# Patient Record
Sex: MALE | Race: WHITE | Employment: OTHER | URBAN - METROPOLITAN AREA
[De-identification: names, ages, dates, MRNs, and addresses within clinical notes are randomized per-mention and may not be internally consistent; named-entity substitution may affect disease eponyms.]

---

## 2020-05-10 ENCOUNTER — APPOINTMENT (OUTPATIENT)
Dept: ULTRASOUND IMAGING | Age: 65
End: 2020-05-10
Attending: EMERGENCY MEDICINE
Payer: MEDICARE

## 2020-05-10 ENCOUNTER — HOSPITAL ENCOUNTER (EMERGENCY)
Age: 65
Discharge: HOME | End: 2020-05-10
Attending: EMERGENCY MEDICINE
Payer: MEDICARE

## 2020-05-10 VITALS
HEIGHT: 70 IN | SYSTOLIC BLOOD PRESSURE: 134 MMHG | HEART RATE: 74 BPM | BODY MASS INDEX: 31.88 KG/M2 | OXYGEN SATURATION: 97 % | WEIGHT: 222.66 LBS | TEMPERATURE: 98.2 F | RESPIRATION RATE: 16 BRPM | DIASTOLIC BLOOD PRESSURE: 89 MMHG

## 2020-05-10 DIAGNOSIS — L03.115 CELLULITIS OF RIGHT LOWER LEG: Primary | ICD-10-CM

## 2020-05-10 PROCEDURE — 99282 EMERGENCY DEPT VISIT SF MDM: CPT

## 2020-05-10 PROCEDURE — 93971 EXTREMITY STUDY: CPT

## 2020-05-10 RX ORDER — CEPHALEXIN 500 MG/1
500 CAPSULE ORAL 3 TIMES DAILY
Qty: 21 CAP | Refills: 0 | Status: SHIPPED | OUTPATIENT
Start: 2020-05-10 | End: 2020-05-17

## 2020-05-10 RX ORDER — AMLODIPINE BESYLATE 10 MG/1
TABLET ORAL DAILY
COMMUNITY
End: 2020-10-06

## 2020-05-10 RX ORDER — DUTASTERIDE AND TAMSULOSIN HYDROCHLORIDE CAPSULES .5; .4 MG/1; MG/1
0.4 CAPSULE ORAL
COMMUNITY
End: 2020-10-06

## 2020-05-10 RX ORDER — METFORMIN HYDROCHLORIDE 1000 MG/1
TABLET, FILM COATED, EXTENDED RELEASE ORAL DAILY
COMMUNITY

## 2020-05-10 NOTE — ED NOTES
Gabby Stokes MD reviewed discharge instructions with patient. Opportunity for questions provided, patient verbalized understanding. Ambulatory with steady gait out of department.

## 2020-05-10 NOTE — ED PROVIDER NOTES
70-year-old male presents from home via private vehicle with a chief complaint of leg swelling. Patient noted symptoms starting about 3 to 4 days ago. Initially had a fever for which she sought coronavirus testing which came back negative. He only had a fever on 1 day and no subsequent fevers. He denies any cough or shortness of breath. He subsequently developed swelling in the right lower calf region along with some tenderness. Who presents with concern for DVT. He reports he had a DVT in the past in 1979 he thinks it may be in the same leg. No exacerbating or alleviating factors. No pain medications taken at home. He has had no further fevers or other symptoms. Denies any chest pain or shortness of breath. Patient adds that he had a recent car trip from [place] that was about 5 or 6 hours in duration. He currently takes no blood thinning medications. Past medical history is significant for back pain, chest pain, diverticulitis, cholecystectomy, DVT.            Past Medical History:   Diagnosis Date    Arthritis     Back pain     Chest pain 5/1/2012    Cholelithiasis 11/12/2010    Chronic pain     Diverticulitis     Endocrine disease     hypothyroidism    Foot drop, left     Headache     Joint pain     Other ill-defined conditions diverticulitis    S/P laparoscopic cholecystectomy 11/29/2010    Thromboembolus ([address] 1979    after dislocated hip and bed rest x 3 weeks    Thyroid disease     hypo    Wrist fracture        Past Surgical History:   Procedure Laterality Date    ENDOSCOPY, COLON, DIAGNOSTIC  2005;2010    HX APPENDECTOMY      HX CHOLECYSTECTOMY  11/13/2010    Dr Beatty Manual    HX HEENT      lasik bilateral    HX HERNIA REPAIR      x 2    HX HIP REPLACEMENT      HX ORTHOPAEDIC  5544,8922    arthroscopy right knee x 2    HX ORTHOPAEDIC  2006    left hip replacement    HX ORTHOPAEDIC      right wrist surgery with pins    HX ORTHOPAEDIC      left foot tendon transfer    HX ORTHOPAEDIC  12     RIGHT TOTAL KNEE ARTHROPLASTY    HX TONSILLECTOMY      HX UROLOGICAL      vasectomy    [place] FOREARM/WRIST SURGERY UNLISTED           Family History:   Problem Relation Age of Onset    Arthritis-rheumatoid Father     Heart Disease Maternal Grandmother     Hypertension Maternal Grandmother     Cancer Maternal Grandfather     Cancer Paternal Grandmother     Heart Disease Paternal Grandmother     Hypertension Paternal Grandmother     Diabetes Mother     Hypertension Mother     Cancer Maternal Grandmother         BCA       Social History     Socioeconomic History    Marital status:      Spouse name: Not on file    Number of children: Not on file    Years of education: Not on file    Highest education level: Not on file   Occupational History    Not on file   Social Needs    Financial resource strain: Not on file    Food insecurity     Worry: Not on file     Inability: Not on file    Transportation needs     Medical: Not on file     Non-medical: Not on file   Tobacco Use    Smoking status: Former Smoker     Packs/day: 0.50     Years: 5.00     Pack years: 2.50     Last attempt to quit: 1982     Years since quittin.8    Smokeless tobacco: Former User   Substance and Sexual Activity    Alcohol use:  Yes     Alcohol/week: 3.3 standard drinks     Comment: Social    Drug use: No     Types: OTC    Sexual activity: Yes     Partners: Female     Birth control/protection: Surgical   Lifestyle    Physical activity     Days per week: Not on file     Minutes per session: Not on file    Stress: Not on file   Relationships    Social connections     Talks on phone: Not on file     Gets together: Not on file     Attends Yarsani service: Not on file     Active member of club or organization: Not on file     Attends meetings of clubs or organizations: Not on file     Relationship status: Not on file    Intimate partner violence     Fear of current or ex partner: Not on file Emotionally abused: Not on file     Physically abused: Not on file     Forced sexual activity: Not on file   Other Topics Concern    Not on file   Social History Narrative    ** Merged History Encounter **              ALLERGIES: Erythromycin; Erythromycin; Pcn [penicillins]; and Pcn [penicillins]    Review of Systems   Constitutional: Negative for diaphoresis and fever. HENT: Negative for facial swelling. Eyes: Negative for visual disturbance. Respiratory: Negative for cough. Cardiovascular: Negative for chest pain. Gastrointestinal: Negative for abdominal pain. Genitourinary: Negative for dysuria. Musculoskeletal: Negative for joint swelling. Skin: Negative for rash. Neurological: Negative for headaches. Hematological: Negative for adenopathy. Psychiatric/Behavioral: Negative for suicidal ideas. Vitals:    05/10/20 1716   BP: 134/89   Pulse: 74   Resp: 16   Temp: 98.2 °F (36.8 °C)   SpO2: 97%   Weight: 101 kg (222 lb 10.6 oz)   Height: 5' 10\" (1.778 m)            Physical Exam  Vitals signs and nursing note reviewed. Constitutional:       General: He is not in acute distress. Appearance: He is well-developed. HENT:      Head: Normocephalic and atraumatic. Eyes:      Pupils: Pupils are equal, round, and reactive to light. Neck:      Musculoskeletal: Normal range of motion and neck supple. Cardiovascular:      Rate and Rhythm: Normal rate. Pulmonary:      Effort: Pulmonary effort is normal. No respiratory distress. Abdominal:      General: There is no distension. Musculoskeletal: Normal range of motion. Right lower leg: Edema present. Skin:     General: Skin is warm and dry. Neurological:      Mental Status: He is alert and oriented to person, place, and time. MDM  Number of Diagnoses or Management Options  Diagnosis management comments: Assessment: Swelling and pain of the right lower calf region. Minimal to the skin.   No obvious fluid collections or breaks in the skin. Symptoms could be the result of a DVT especially considering the patient's recent travel history. Given his fever 4 days ago cannot exclude a cellulitis although I think this is less likely. Will obtain a lower extremity DVT study. If positive he can be discharged home on oral anticoagulants. If negative will consider a course of antibiotics to treat for cellulitis.          Procedures

## 2020-05-10 NOTE — DISCHARGE INSTRUCTIONS

## 2020-08-20 ENCOUNTER — HOSPITAL ENCOUNTER (OUTPATIENT)
Dept: PREADMISSION TESTING | Age: 65
Discharge: HOME | End: 2020-08-20
Payer: MEDICARE

## 2020-08-20 DIAGNOSIS — Z01.812 PRE-PROCEDURE LAB EXAM: ICD-10-CM

## 2020-08-20 PROCEDURE — 87635 SARS-COV-2 COVID-19 AMP PRB: CPT

## 2020-08-21 LAB — SARS-COV-2, COV2NT: NOT DETECTED

## 2020-08-24 ENCOUNTER — HOSPITAL ENCOUNTER (OUTPATIENT)
Age: 65
Setting detail: OUTPATIENT SURGERY
Discharge: HOME | End: 2020-08-24
Attending: INTERNAL MEDICINE | Admitting: INTERNAL MEDICINE
Payer: MEDICARE

## 2020-08-24 ENCOUNTER — ANESTHESIA (OUTPATIENT)
Dept: ENDOSCOPY | Age: 65
End: 2020-08-24
Payer: MEDICARE

## 2020-08-24 ENCOUNTER — ANESTHESIA EVENT (OUTPATIENT)
Dept: ENDOSCOPY | Age: 65
End: 2020-08-24
Payer: MEDICARE

## 2020-08-24 VITALS
SYSTOLIC BLOOD PRESSURE: 119 MMHG | HEIGHT: 70 IN | RESPIRATION RATE: 12 BRPM | TEMPERATURE: 97.6 F | WEIGHT: 224 LBS | OXYGEN SATURATION: 96 % | DIASTOLIC BLOOD PRESSURE: 75 MMHG | BODY MASS INDEX: 32.07 KG/M2 | HEART RATE: 55 BPM

## 2020-08-24 PROCEDURE — 76060000031 HC ANESTHESIA FIRST 0.5 HR: Performed by: INTERNAL MEDICINE

## 2020-08-24 PROCEDURE — 77030009426 HC FCPS BIOP ENDOSC BSC -B: Performed by: INTERNAL MEDICINE

## 2020-08-24 PROCEDURE — 74011250637 HC RX REV CODE- 250/637: Performed by: INTERNAL MEDICINE

## 2020-08-24 PROCEDURE — 76040000019: Performed by: INTERNAL MEDICINE

## 2020-08-24 PROCEDURE — 88305 TISSUE EXAM BY PATHOLOGIST: CPT

## 2020-08-24 PROCEDURE — 74011250636 HC RX REV CODE- 250/636: Performed by: NURSE ANESTHETIST, CERTIFIED REGISTERED

## 2020-08-24 RX ORDER — SODIUM CHLORIDE 0.9 % (FLUSH) 0.9 %
5-40 SYRINGE (ML) INJECTION AS NEEDED
Status: DISCONTINUED | OUTPATIENT
Start: 2020-08-24 | End: 2020-08-24 | Stop reason: HOSPADM

## 2020-08-24 RX ORDER — MIDAZOLAM HYDROCHLORIDE 1 MG/ML
.25-5 INJECTION, SOLUTION INTRAMUSCULAR; INTRAVENOUS
Status: DISCONTINUED | OUTPATIENT
Start: 2020-08-24 | End: 2020-08-24 | Stop reason: HOSPADM

## 2020-08-24 RX ORDER — SODIUM CHLORIDE 0.9 % (FLUSH) 0.9 %
5-40 SYRINGE (ML) INJECTION EVERY 8 HOURS
Status: DISCONTINUED | OUTPATIENT
Start: 2020-08-24 | End: 2020-08-24 | Stop reason: HOSPADM

## 2020-08-24 RX ORDER — FLUMAZENIL 0.1 MG/ML
0.2 INJECTION INTRAVENOUS
Status: DISCONTINUED | OUTPATIENT
Start: 2020-08-24 | End: 2020-08-24 | Stop reason: HOSPADM

## 2020-08-24 RX ORDER — SODIUM CHLORIDE 9 MG/ML
50 INJECTION, SOLUTION INTRAVENOUS CONTINUOUS
Status: DISCONTINUED | OUTPATIENT
Start: 2020-08-24 | End: 2020-08-24 | Stop reason: HOSPADM

## 2020-08-24 RX ORDER — ATROPINE SULFATE 0.1 MG/ML
0.5 INJECTION INTRAVENOUS
Status: DISCONTINUED | OUTPATIENT
Start: 2020-08-24 | End: 2020-08-24 | Stop reason: HOSPADM

## 2020-08-24 RX ORDER — FENTANYL CITRATE 50 UG/ML
25-200 INJECTION, SOLUTION INTRAMUSCULAR; INTRAVENOUS
Status: DISCONTINUED | OUTPATIENT
Start: 2020-08-24 | End: 2020-08-24 | Stop reason: HOSPADM

## 2020-08-24 RX ORDER — NALOXONE HYDROCHLORIDE 0.4 MG/ML
0.4 INJECTION, SOLUTION INTRAMUSCULAR; INTRAVENOUS; SUBCUTANEOUS
Status: DISCONTINUED | OUTPATIENT
Start: 2020-08-24 | End: 2020-08-24 | Stop reason: HOSPADM

## 2020-08-24 RX ORDER — PROPOFOL 10 MG/ML
INJECTION, EMULSION INTRAVENOUS AS NEEDED
Status: DISCONTINUED | OUTPATIENT
Start: 2020-08-24 | End: 2020-08-24 | Stop reason: HOSPADM

## 2020-08-24 RX ORDER — DEXTROMETHORPHAN/PSEUDOEPHED 2.5-7.5/.8
1.2 DROPS ORAL
Status: DISCONTINUED | OUTPATIENT
Start: 2020-08-24 | End: 2020-08-24 | Stop reason: HOSPADM

## 2020-08-24 RX ORDER — SODIUM CHLORIDE 9 MG/ML
INJECTION, SOLUTION INTRAVENOUS
Status: DISCONTINUED | OUTPATIENT
Start: 2020-08-24 | End: 2020-08-24 | Stop reason: HOSPADM

## 2020-08-24 RX ORDER — EPINEPHRINE 0.1 MG/ML
1 INJECTION INTRACARDIAC; INTRAVENOUS
Status: DISCONTINUED | OUTPATIENT
Start: 2020-08-24 | End: 2020-08-24 | Stop reason: HOSPADM

## 2020-08-24 RX ADMIN — PROPOFOL 50 MG: 10 INJECTION, EMULSION INTRAVENOUS at 10:11

## 2020-08-24 RX ADMIN — PROPOFOL 100 MG: 10 INJECTION, EMULSION INTRAVENOUS at 10:03

## 2020-08-24 RX ADMIN — PROPOFOL 50 MG: 10 INJECTION, EMULSION INTRAVENOUS at 10:09

## 2020-08-24 RX ADMIN — PROPOFOL 50 MG: 10 INJECTION, EMULSION INTRAVENOUS at 10:13

## 2020-08-24 RX ADMIN — SODIUM CHLORIDE: 900 INJECTION, SOLUTION INTRAVENOUS at 10:01

## 2020-08-24 RX ADMIN — PROPOFOL 50 MG: 10 INJECTION, EMULSION INTRAVENOUS at 10:05

## 2020-08-24 RX ADMIN — PROPOFOL 50 MG: 10 INJECTION, EMULSION INTRAVENOUS at 10:15

## 2020-08-24 RX ADMIN — PROPOFOL 50 MG: 10 INJECTION, EMULSION INTRAVENOUS at 10:07

## 2020-08-24 NOTE — DISCHARGE INSTRUCTIONS
[address]    COLON DISCHARGE INSTRUCTIONS    Branden Steele  [id number]  1955    Discomfort:  Redness at IV site- apply warm compress to area; if redness or soreness persist- contact your physician  There may be a slight amount of blood passed from the rectum  Gaseous discomfort- walking, belching will help relieve any discomfort  You may not operate a vehicle for 12 hours  You may not engage in an occupation involving machinery or appliances for rest of today  You may not drink alcoholic beverages for at least 12 hours  Avoid making any critical decisions for at least 24 hour  DIET:  You may resume your regular diet - however -  remember your colon is empty and a heavy meal will produce gas. Avoid these foods:  vegetables, fried / greasy foods, carbonated drinks     ACTIVITY:  You may  resume your normal daily activities it is recommended that you spend the remainder of the day resting -  avoid any strenuous activity. CALL M.D. ANY SIGN OF:   Increasing pain, nausea, vomiting  Abdominal distension (swelling)  New increased bleeding (oral or rectal)  Fever (chills)  Pain in chest area  Bloody discharge from nose or mouth  Shortness of breath      Follow-up Instructions:   Call Dr. Matthew Haines for any questions or problems at [phone]   High fiber diet          ENDOSCOPY FINDINGS:   Your colonoscopy showed one polyp removed and diverticulosis. Telephone # [phone]      Signed By: Matthew Haines MD     8/24/2020  10:29 AM       DISCHARGE SUMMARY from Nurse    The following personal items collected during your admission are returned to you:   Dental Appliance: Dental Appliances: None  Vision: Visual Aid: Glasses  Hearing Aid:    Jewelry:    Clothing:    Other Valuables:    Valuables sent to safe:        Learning About Coronavirus (COVID-19)  Coronavirus (COVID-19): Overview  What is coronavirus (EHUZA-34)?   The coronavirus disease (COVID-19) is caused by a virus. It is an illness that was first found in [place], [place], in December 2019. It has since spread worldwide. The virus can cause fever, cough, and trouble breathing. In severe cases, it can cause pneumonia and make it hard to breathe without help. It can cause death. Coronaviruses are a large group of viruses. They cause the common cold. They also cause more serious illnesses like Middle East respiratory syndrome (MERS) and severe acute respiratory syndrome (SARS). COVID-19 is caused by a novel coronavirus. That means it's a new type that has not been seen in people before. This virus spreads person-to-person through droplets from coughing and sneezing. It can also spread when you are close to someone who is infected. And it can spread when you touch something that has the virus on it, such as a doorknob or a tabletop. What can you do to protect yourself from coronavirus (COVID-19)? The best way to protect yourself from getting sick is to:  · Avoid areas where there is an outbreak. · Avoid contact with people who may be infected. · Wash your hands often with soap or alcohol-based hand sanitizers. · Avoid crowds and try to stay at least 6 feet away from other people. · Wash your hands often, especially after you cough or sneeze. Use soap and water, and scrub for at least 20 seconds. If soap and water aren't available, use an alcohol-based hand . · Avoid touching your mouth, nose, and eyes. What can you do to avoid spreading the virus to others? To help avoid spreading the virus to others:  · Cover your mouth with a tissue when you cough or sneeze. Then throw the tissue in the trash. · Use a disinfectant to clean things that you touch often. · Stay home if you are sick or have been exposed to the virus. Don't go to school, work, or public areas. And don't use public transportation. · If you are sick:  ? Leave your home only if you need to get medical care. But call the doctor's office first so they know you're coming. And wear a face mask, if you have one.  ? If you have a face mask, wear it whenever you're around other people. It can help stop the spread of the virus when you cough or sneeze. ? Clean and disinfect your home every day. Use household  and disinfectant wipes or sprays. Take special care to clean things that you grab with your hands. These include doorknobs, remote controls, phones, and handles on your refrigerator and microwave. And don't forget countertops, tabletops, bathrooms, and computer keyboards. When to call for help  Call 911 anytime you think you may need emergency care. For example, call if:  · You have severe trouble breathing. (You can't talk at all.)  · You have constant chest pain or pressure. · You are severely dizzy or lightheaded. · You are confused or can't think clearly. · Your face and lips have a blue color. · You pass out (lose consciousness) or are very hard to wake up. Call your doctor now if you develop symptoms such as:  · Shortness of breath. · Fever. · Cough. If you need to get care, call ahead to the doctor's office for instructions before you go. Make sure you wear a face mask, if you have one, to prevent exposing other people to the virus. Where can you get the latest information? The following health organizations are tracking and studying this virus. Their websites contain the most up-to-date information. Jeffrey Murry also learn what to do if you think you may have been exposed to the virus. · U.S. Centers for Disease Control and Prevention (CDC): The CDC provides updated news about the disease and travel advice. The website also tells you how to prevent the spread of infection. www.cdc.gov  · World Health Organization [facility]): WHO offers information about the virus outbreaks.  WHO also has travel advice. www.who.int  Current as of: April 1, 2020               Content Version: 12.4  © 3960-3663 [employer]. Care instructions adapted under license by your healthcare professional. If you have questions about a medical condition or this instruction, always ask your healthcare professional. [address] any warranty or liability for your use of this information.

## 2020-08-24 NOTE — H&P
[address], [address] [place] [address]      History and Physical       NAME:  Scar Du   :   1955   MRN:   [id number]             History of Present Illness:  Patient is a 72 y.o. who is seen for screening colonoscopy . PMH:  Past Medical History:   Diagnosis Date    Arthritis     Back pain     Chest pain 2012    Cholelithiasis 2010    Chronic pain     Diabetes mellitus type 2, noninsulin dependent ([address]     Diverticulitis     Endocrine disease     hypothyroidism    Foot drop, left     Headache(784.0)     Joint pain     Other ill-defined conditions(799.89) diverticulitis    S/P laparoscopic cholecystectomy 2010    Thromboembolus ([address] 1979    after dislocated hip and bed rest x 3 weeks    Thyroid disease     hypo    Wrist fracture        PSH:  Past Surgical History:   Procedure Laterality Date    ENDOSCOPY, COLON, DIAGNOSTIC  ;    HX APPENDECTOMY      HX CHOLECYSTECTOMY  2010    Dr Dhiraj Osman    HX HEENT      lasik bilateral    HX HERNIA REPAIR      x 2    HX HIP REPLACEMENT      HX ORTHOPAEDIC  0591,6817    arthroscopy right knee x 2    HX ORTHOPAEDIC  2006    left hip replacement    HX ORTHOPAEDIC      right wrist surgery with pins    HX ORTHOPAEDIC      left foot tendon transfer    HX ORTHOPAEDIC  12     RIGHT TOTAL KNEE ARTHROPLASTY    HX RETINAL DETACHMENT REPAIR  2020    HX TONSILLECTOMY      HX UROLOGICAL      vasectomy    [place] FOREARM/WRIST SURGERY UNLISTED         Allergies: Allergies   Allergen Reactions    Erythromycin Rash and Swelling    Erythromycin Swelling    Pcn [Penicillins] Rash    Pcn [Penicillins] Rash       Home Medications:  Prior to Admission Medications   Prescriptions Last Dose Informant Patient Reported? Taking? Dutasteride-Tamsulosin 0.5-0.4 mg  at Unknown time  Yes Yes   Sig: Take 0.4 mg by mouth.    MULTIVITAMINS (MULTIVITAMIN PO) 2020  Yes No Sig: Take 1 Tab by mouth. amLODIPine (NORVASC) 10 mg tablet 2020 at Unknown time  Yes Yes   Sig: Take  by mouth daily. aspirin 81 mg tablet 2020 at Unknown time  Yes Yes   Sig: Take  by mouth daily. Indications: MYOCARDIAL INFARCTION PREVENTION   cholecalciferol, vitamin D3, (VITAMIN D3) 2,000 unit Tab 2020 at Unknown time  Yes Yes   Sig: Take  by mouth daily. hydrochlorothiazide (HYDRODIURIL) 25 mg tablet 2020 at Unknown time  No Yes   Sig: TAKE 1 TABLET DAILY   levothyroxine (SYNTHROID) 112 mcg tablet 2020 at Unknown time  No Yes   Sig: TAKE 1 TAB BY MOUTH DAILY (BEFORE BREAKFAST). Patient taking differently: 125 mcg.   metFORMIN (GLUMETZA) 1,000 mg TG24 24 hour tablet 2020 at Unknown time  Yes Yes   Sig: Take  by mouth daily. pravastatin (PRAVACHOL) 20 mg tablet 2020  No No   Sig: TAKE 1 TABLET BY MOUTH DAILY      Facility-Administered Medications: None       Hospital Medications:  No current facility-administered medications for this encounter. Social History:  Social History     Tobacco Use    Smoking status: Former Smoker     Packs/day: 0.50     Years: 5.00     Pack years: 2.50     Last attempt to quit: 1982     Years since quittin.1    Smokeless tobacco: Former User   Substance Use Topics    Alcohol use: Yes     Comment: Social       Family History:  Family History   Problem Relation Age of Onset    Diabetes Mother     Hypertension Mother     Arthritis-rheumatoid Father     Heart Disease Maternal Grandmother     Hypertension Maternal Grandmother     Cancer Maternal Grandmother         BCA    Cancer Maternal Grandfather     Cancer Paternal Grandmother     Heart Disease Paternal Grandmother     Hypertension Paternal Grandmother          The patient was counseled at length about the risks of areli Covid-19 in the tiffany-operative and post-operative states including the recovery window of their procedure.   The patient was made aware that areli Covid-19 after a surgical procedure may worsen their prognosis for recovering from the virus and lend to a higher morbidity and or mortality risk. The patient was given the options of postponing their procedure. All of the risks, benefits, and alternatives were discussed. The patient does  wish to proceed with the procedure. Review of Systems:      Constitutional: negative fever, negative chills, negative weight loss  Eyes:   negative visual changes  ENT:   negative sore throat, tongue or lip swelling  Respiratory:  negative cough, negative dyspnea  Cards:  negative for chest pain, palpitations, lower extremity edema  GI:   See HPI  :  negative for frequency, dysuria  Integument:  negative for rash and pruritus  Heme:  negative for easy bruising and gum/nose bleeding  Musculoskel: negative for myalgias,  back pain and muscle weakness  Neuro: negative for headaches, dizziness, vertigo  Psych:  negative for feelings of anxiety, depression       Objective:     Patient Vitals for the past 8 hrs:   Height Weight   08/24/20 0942 5' 10\" (1.778 m) 101.6 kg (224 lb)     No intake/output data recorded. No intake/output data recorded. EXAM:     NEURO-a&o   HEENT-wnl   LUNGS-clear    COR-regular rate and rhythym     ABD-soft , no tenderness, no rebound, good bs     EXT-no edema     Data Review     No results for input(s): WBC, HGB, HCT, PLT, HGBEXT, HCTEXT, PLTEXT in the last 72 hours. No results for input(s): NA, K, CL, CO2, BUN, CREA, GLU, PHOS, CA in the last 72 hours. No results for input(s): AP, TBIL, TP, ALB, GLOB, GGT, AML, LPSE in the last 72 hours. No lab exists for component: SGOT, GPT, AMYP, HLPSE  No results for input(s): INR, PTP, APTT, INREXT in the last 72 hours. Assessment:     · Screening colonoscopy      Patient Active Problem List   Diagnosis Code    S/P laparoscopic cholecystectomy Z90.49    S/P prosthetic total arthroplasty of the hip Z96.649    Obesity (BMI 30-39. 9) E66.9    Vitamin D deficiency E55.9    Glucose intolerance (impaired glucose tolerance) R73.02    Mixed hyperlipidemia E78.2    Hypothyroidism E03.9    Chest pain R07.9    DJD (degenerative joint disease) of knee M17.10    Sepsis, unspecified A41.9           Plan:   ·   · Endoscopic procedure with sedation     Signed By: Geri Suh MD     8/24/2020  9:48 AM

## 2020-08-24 NOTE — PROGRESS NOTES

## 2020-08-24 NOTE — PROCEDURES
[address]  [address]      Colonoscopy Operative Report    Son Almaraz  [id number]  1955      Procedure Type:   Colonoscopy with polypectomy (hot biopsy)     Indications:    Screening colonoscopy       Pre-operative Diagnosis: see indication above    Post-operative Diagnosis:  See findings below    :  Faye Henning MD    Surgical Assistant: None    Implants:  None    Referring Provider: Other, MD Cristhian      Sedation:  MAC anesthesia Propofol      Procedure Details:  After informed consent was obtained with all risks and benefits of procedure explained and preoperative exam completed, the patient was taken to the endoscopy suite and placed in the left lateral decubitus position. Upon sequential sedation as per above, a digital rectal exam was performed demonstrating internal hemorrhoids. The Olympus videocolonoscope  was inserted in the rectum and carefully advanced to the cecum, which was identified by the ileocecal valve and appendiceal orifice. The cecum was identified by the ileocecal valve and appendiceal orifice. The quality of preparation was good. The colonoscope was slowly withdrawn with careful evaluation between folds. Retroflexion in the rectum was completed . Findings:   Rectum: normal  Sigmoid: moderate diverticulosis  Descending Colon: normal  Transverse Colon: normal  Ascending Colon: 1.2 cm sessile polyp in proximal ascending colon removed by hot biopsy  Cecum: normal        Specimen Removed:  as above    Complications: None. EBL:  None. Impression:    see findings    Recommendations: --Await pathology.     High fiber diet  Recommendation for next colonscopy in 3 versus 10  years    Signed By: Faye Henning MD     8/24/2020  10:24 AM

## 2020-08-24 NOTE — ROUTINE PROCESS
Wei Tl 1955 
[id number] Situation: 
Verbal report received from: [facility] Procedure: Procedure(s): 
COLONOSCOPY    :- 
ENDOSCOPIC POLYPECTOMY Background: 
 
Preoperative diagnosis: SCREENING Postoperative diagnosis: 1. Colon Polyp :  Dr. Venus Horne Assistant(s): Endoscopy Technician-1: Jose Cruz Mckeon Endoscopy RN-1: Rui Odom RN Specimens:  
ID Type Source Tests Collected by Time Destination 1 : Ascending Colon Polyp Preservative   Kevin Salas MD 8/24/2020 1013 Pathology H. Pylori Assessment: 
Intra-procedure medications Anesthesia gave intra-procedure sedation and medications, see anesthesia flow sheet yes Intravenous fluids: NS@ Jenaro Messenger Vital signs stable yes Abdominal assessment: round and soft yes Recommendation: 
Discharge patient per MD order Return to floor Family or Friend yes Permission to share finding with family or friend no

## 2020-08-24 NOTE — ANESTHESIA PREPROCEDURE EVALUATION
Relevant Problems   No relevant active problems       Anesthetic History   No history of anesthetic complications            Review of Systems / Medical History  Patient summary reviewed, nursing notes reviewed and pertinent labs reviewed    Pulmonary  Within defined limits                 Neuro/Psych   Within defined limits           Cardiovascular    Hypertension              Exercise tolerance: >4 METS     GI/Hepatic/Renal  Within defined limits              Endo/Other    Diabetes: well controlled, type 2  Hypothyroidism: well controlled       Other Findings              Physical Exam    Airway  Mallampati: II  TM Distance: > 6 cm  Neck ROM: normal range of motion   Mouth opening: Normal     Cardiovascular  Regular rate and rhythm,  S1 and S2 normal,  no murmur, click, rub, or gallop             Dental  No notable dental hx       Pulmonary  Breath sounds clear to auscultation               Abdominal  GI exam deferred       Other Findings            Anesthetic Plan    ASA: 2  Anesthesia type: MAC            Anesthetic plan and risks discussed with: Patient

## 2020-10-06 ENCOUNTER — OFFICE VISIT (OUTPATIENT)
Dept: CARDIOLOGY CLINIC | Age: 65
End: 2020-10-06
Payer: MEDICARE

## 2020-10-06 VITALS
SYSTOLIC BLOOD PRESSURE: 110 MMHG | OXYGEN SATURATION: 99 % | DIASTOLIC BLOOD PRESSURE: 80 MMHG | WEIGHT: 231.4 LBS | HEART RATE: 61 BPM | RESPIRATION RATE: 15 BRPM | HEIGHT: 70 IN | BODY MASS INDEX: 33.13 KG/M2

## 2020-10-06 DIAGNOSIS — Z01.818 PRE-OP TESTING: ICD-10-CM

## 2020-10-06 DIAGNOSIS — I10 ESSENTIAL HYPERTENSION: ICD-10-CM

## 2020-10-06 DIAGNOSIS — E66.9 OBESITY, CLASS I, BMI 30-34.9: ICD-10-CM

## 2020-10-06 DIAGNOSIS — I25.83 CORONARY ARTERY DISEASE DUE TO LIPID RICH PLAQUE: Primary | ICD-10-CM

## 2020-10-06 DIAGNOSIS — R53.83 FATIGUE, UNSPECIFIED TYPE: ICD-10-CM

## 2020-10-06 DIAGNOSIS — I25.10 CORONARY ARTERY DISEASE DUE TO LIPID RICH PLAQUE: Primary | ICD-10-CM

## 2020-10-06 DIAGNOSIS — R06.00 DYSPNEA AND RESPIRATORY ABNORMALITY: ICD-10-CM

## 2020-10-06 DIAGNOSIS — R06.89 DYSPNEA AND RESPIRATORY ABNORMALITY: ICD-10-CM

## 2020-10-06 DIAGNOSIS — E78.2 MIXED HYPERLIPIDEMIA: ICD-10-CM

## 2020-10-06 PROCEDURE — G0463 HOSPITAL OUTPT CLINIC VISIT: HCPCS | Performed by: INTERNAL MEDICINE

## 2020-10-06 PROCEDURE — 93005 ELECTROCARDIOGRAM TRACING: CPT | Performed by: INTERNAL MEDICINE

## 2020-10-06 PROCEDURE — 93010 ELECTROCARDIOGRAM REPORT: CPT | Performed by: INTERNAL MEDICINE

## 2020-10-06 PROCEDURE — 99204 OFFICE O/P NEW MOD 45 MIN: CPT | Performed by: INTERNAL MEDICINE

## 2020-10-06 RX ORDER — LEVOTHYROXINE SODIUM 125 UG/1
TABLET ORAL
COMMUNITY

## 2020-10-06 RX ORDER — TAMSULOSIN HYDROCHLORIDE 0.4 MG/1
0.4 CAPSULE ORAL DAILY
COMMUNITY

## 2020-10-06 RX ORDER — ROSUVASTATIN CALCIUM 10 MG/1
10 TABLET, COATED ORAL
Qty: 90 TAB | Refills: 1 | Status: SHIPPED | OUTPATIENT
Start: 2020-10-06

## 2020-10-06 RX ORDER — AMLODIPINE BESYLATE 5 MG/1
5 TABLET ORAL DAILY
COMMUNITY

## 2020-10-06 NOTE — PATIENT INSTRUCTIONS
Please stop taking pravastatin and begin rosuvastatin 10mg daily Please have fasting labs checked in 6 months to reassess your cholesterol

## 2020-10-06 NOTE — PROGRESS NOTES
[place]: Robert Michael  [phone]    HPI: Bert Garza, a 72y.o. year-old who presents for evaluation of  Hyperlipidemia and HDL results. Last OV in 2013  He has been working in [employer] the last several years and just retired   Denies chest pain or palpitations  No dyspnea with exertion that has been alarming, some mild changes that may be inactivity, age, weight, etc.     Not going to the gym right now  Was walking and then got plantar fascitis  Reports some mild fatigue, worse recently    No dizziness or syncope   Right > left LE edema     Today we reviewed his personal history for coronary events and his prior calcium score which was elevated at a young age. As well as his current targets for activity cholesterol weight etc.  Hopefully with his [facility] he will be able to improve his healthy lifestyle and continue to work on these numbers and in the meantime we will make some minor adjustments to his cholesterol medication and proceed with stress testing to evaluate him for coronary disease progression over the last 8 years. He does have some significant edema on exam 2+ bilaterally right greater than left and needs reassessment of his heart function which we will do at the time of stress echo    HDL labs in the past -   Normal inflammatory, normal BNP, normal aspirin works  ApoE 3/4    Assessment & Plan:  1. Chest pain - none currently   2. Dyspnea - none currently   3. Edema - 2+, right > left    4. HTN - well controlled on HCTZ and amlodipine   -K 3.9, Cr 0.79 in 5/20   5  Hyperlipidemia- on Pravastatin 20mg daily, LDL 91, , HDL 41 in 5/20   -discussed LDL goal <70 for CAD, advised him to stop pravastatin and begin rosuvastatin 10mg daily and have fasting lipids checked in 6 months with PCP  6.  CAD- by calcium scan in 2012, continue ASA and statin as above, with c/o fatigue will proceed with stress echo to assess for ischemia  -if his stress test is ok he will follow up here again in 1 year  7. Obesity Body mass index is 33.2 kg/m². needs to work on diet and exercise  8. Systolic ejection murmur - will check valves during stress echo   9. IGT - on metformin per PCP     Ca Score 7/12 99 (60th percentile)     Soc Hx: no tobacco use (remote hx), occ cigar, social etoh use   Fam Hx: no early cad    He  has a past medical history of Arthritis, Back pain, Chest pain (5/1/2012), Cholelithiasis (11/12/2010), Chronic pain, Diabetes mellitus type 2, noninsulin dependent ([facility] [address] Diverticulitis, Endocrine disease, Foot drop, left, Headache(784.0), Joint pain, Other ill-defined conditions(799.89) (diverticulitis), S/P laparoscopic cholecystectomy (11/29/2010), Th[facility]embolus ([facility] ([facility]), Thyroid disease, and Wrist fracture. Cardiovascular ROS: no chest pain or dyspnea on exertion  Respiratory ROS: no cough, shortness of breath, or wheezing  Neurological ROS: no TIA or stroke symptoms  All other systems negative except as above. PE  Vitals:    10/06/20 1046   BP: 110/80   Pulse: 61   Resp: 15   SpO2: 99%   Weight: 231 lb 6.4 oz (105 kg)   Height: 5' 10\" (1.778 m)    Body mass index is 33.2 kg/m².    General appearance - alert, well appearing, and in no distress  Mental status - affect appropriate to mood  Eyes - sclera anicteric, moist mucous membranes  Neck - supple, no carotid bruits   Lymphatics - no  lymphadenopathy  Chest - clear to auscultation, no wheezes, rales or rhonchi  Heart - normal rate, regular rhythm, normal S1, S2, 2/6 JAIME   Abdomen - soft, nontender, nondistended  Back exam - full range of motion, no tenderness  Neurological - cranial nerves II through XII grossly intact, no focal deficit  Musculoskeletal - no muscular tenderness noted, normal strength  Extremities - peripheral pulses normal, 2+ pedal edema  Skin - normal coloration  no rashes    12 lead ECG: NSR with iRBBB (chronic)    Recent Labs:  Lab Results   Component Value Date/Time    Cholesterol, total 114 09/12/2012 03:50 AM    HDL Cholesterol 27 2012 03:50 AM    LDL, calculated 60.6 2012 03:50 AM    Triglyceride 132 2012 03:50 AM    CHOL/HDL Ratio 4.2 2012 03:50 AM     Lab Results   Component Value Date/Time    Creatinine 1.1 09/10/2012 04:00 AM     Lab Results   Component Value Date/Time    BUN 15 09/10/2012 04:00 AM    BUN (POC) 22 (H) 2012 04:13 AM     Lab Results   Component Value Date/Time    Potassium 4.2 09/10/2012 04:00 AM     Lab Results   Component Value Date/Time    Hemoglobin A1c 5.8 2012 01:34 PM     Lab Results   Component Value Date/Time    HGB 13.7 2012 11:45 AM     Lab Results   Component Value Date/Time    PLATELET 990  11:45 AM       Reviewed:  Past Medical History:   Diagnosis Date    Arthritis     Back pain     Chest pain 2012    Cholelithiasis 2010    Chronic pain     Diabetes mellitus type 2, noninsulin dependent ([address]     Diverticulitis     Endocrine disease     hypothyroidism    Foot drop, left     Headache(784.0)     Joint pain     Other ill-defined conditions(799.89) diverticulitis    S/P laparoscopic cholecystectomy 2010    Thromboembolus ([address] 1979    after dislocated hip and bed rest x 3 weeks    Thyroid disease     hypo    Wrist fracture      Social History     Tobacco Use   Smoking Status Former Smoker    Packs/day: 0.50    Years: 5.00    Pack years: 2.50    Last attempt to quit: 1982    Years since quittin.2   Smokeless Tobacco Former User     Social History     Substance and Sexual Activity   Alcohol Use Yes    Comment: Social     Allergies   Allergen Reactions    Erythromycin Rash and Swelling    Erythromycin Swelling    Pcn [Penicillins] Rash    Pcn [Penicillins] Rash       Current Outpatient Medications   Medication Sig    levothyroxine (SYNTHROID) 125 mcg tablet Take  by mouth Daily (before breakfast).  amLODIPine (NORVASC) 5 mg tablet Take 5 mg by mouth daily.     tamsulosin (FLOMAX) 0.4 mg capsule Take 0.4 mg by mouth daily.  rosuvastatin (CRESTOR) 10 mg tablet Take 1 Tab by mouth nightly.  metFORMIN (GLUMETZA) 1,000 mg TG24 24 hour tablet Take  by mouth daily.  hydrochlorothiazide (HYDRODIURIL) 25 mg tablet TAKE 1 TABLET DAILY    cholecalciferol, vitamin D3, (VITAMIN D3) 2,000 unit Tab Take  by mouth daily.  aspirin 81 mg tablet Take  by mouth daily. Indications: MYOCARDIAL INFARCTION PREVENTION    MULTIVITAMINS (MULTIVITAMIN PO) Take 1 Tab by mouth. No current facility-administered medications for this visit.         Lucille Leach MD  [facility] [place]  [address] [facility] [address]  [address]

## 2020-10-18 DIAGNOSIS — Z01.818 PRE-OP TESTING: ICD-10-CM

## 2020-11-16 ENCOUNTER — TRANSCRIBE ORDER (OUTPATIENT)
Dept: REGISTRATION | Age: 65
End: 2020-11-16

## 2020-11-16 ENCOUNTER — HOSPITAL ENCOUNTER (OUTPATIENT)
Dept: PREADMISSION TESTING | Age: 65
Discharge: HOME | End: 2020-11-16
Payer: MEDICARE

## 2020-11-16 DIAGNOSIS — Z01.812 PRE-PROCEDURE LAB EXAM: ICD-10-CM

## 2020-11-16 DIAGNOSIS — Z01.812 PRE-PROCEDURE LAB EXAM: Primary | ICD-10-CM

## 2020-11-16 PROCEDURE — 87635 SARS-COV-2 COVID-19 AMP PRB: CPT

## 2020-11-18 LAB — SARS-COV-2, COV2NT: NOT DETECTED

## 2020-11-20 ENCOUNTER — APPOINTMENT (OUTPATIENT)
Dept: CARDIOLOGY CLINIC | Age: 65
End: 2020-11-20

## 2020-11-20 ENCOUNTER — ANCILLARY PROCEDURE (OUTPATIENT)
Dept: CARDIOLOGY CLINIC | Age: 65
End: 2020-11-20
Payer: MEDICARE

## 2020-11-20 VITALS
BODY MASS INDEX: 33.07 KG/M2 | DIASTOLIC BLOOD PRESSURE: 88 MMHG | HEIGHT: 70 IN | SYSTOLIC BLOOD PRESSURE: 130 MMHG | WEIGHT: 231 LBS

## 2020-11-20 DIAGNOSIS — I25.83 CORONARY ARTERY DISEASE DUE TO LIPID RICH PLAQUE: ICD-10-CM

## 2020-11-20 DIAGNOSIS — I25.10 CORONARY ARTERY DISEASE DUE TO LIPID RICH PLAQUE: ICD-10-CM

## 2020-11-20 DIAGNOSIS — R53.83 FATIGUE, UNSPECIFIED TYPE: ICD-10-CM

## 2020-11-20 PROCEDURE — 93351 STRESS TTE COMPLETE: CPT | Performed by: INTERNAL MEDICINE

## 2020-11-23 LAB
ECHO AO ASC DIAM: 4.6 CM
STRESS ANGINA INDEX: 0
STRESS BASELINE DIAS BP: 88 MMHG
STRESS BASELINE HR: 67 BPM
STRESS BASELINE SYS BP: 130 MMHG
STRESS ESTIMATED WORKLOAD: 10.1 METS
STRESS EXERCISE DUR MIN: NORMAL MIN:SEC
STRESS O2 SAT PEAK: 97 %
STRESS PEAK DIAS BP: 88 MMHG
STRESS PEAK SYS BP: 200 MMHG
STRESS PERCENT HR ACHIEVED: 94 %
STRESS POST PEAK HR: 146 BPM
STRESS RATE PRESSURE PRODUCT: NORMAL BPM*MMHG
STRESS SR DUKE TREADMILL SCORE: 9
STRESS ST DEPRESSION: 0 MM
STRESS ST ELEVATION: 0 MM
STRESS TARGET HR: 155 BPM

## 2021-08-02 ENCOUNTER — TELEPHONE (OUTPATIENT)
Dept: CARDIOLOGY CLINIC | Age: 66
End: 2021-08-02

## 2021-08-02 NOTE — TELEPHONE ENCOUNTER
8/2/2021 at 11:05 left message to advise 10/6/2021 appointment with Dr. Blake Herr has been cancelled - requested return call to discuss reschedule options

## 2023-05-11 RX ORDER — AMLODIPINE BESYLATE 5 MG/1
5 TABLET ORAL DAILY
COMMUNITY

## 2023-05-11 RX ORDER — METFORMIN HYDROCHLORIDE 1000 MG/1
TABLET, FILM COATED, EXTENDED RELEASE ORAL DAILY
COMMUNITY

## 2023-05-11 RX ORDER — HYDROCHLOROTHIAZIDE 25 MG/1
1 TABLET ORAL DAILY
COMMUNITY
Start: 2014-08-04

## 2023-05-11 RX ORDER — ROSUVASTATIN CALCIUM 10 MG/1
TABLET, COATED ORAL
COMMUNITY
Start: 2020-10-06

## 2023-05-11 RX ORDER — LEVOTHYROXINE SODIUM 0.12 MG/1
TABLET ORAL
COMMUNITY

## 2023-05-11 RX ORDER — TAMSULOSIN HYDROCHLORIDE 0.4 MG/1
0.4 CAPSULE ORAL DAILY
COMMUNITY

## 2025-05-30 NOTE — ANESTHESIA POSTPROCEDURE EVALUATION
Post-Anesthesia Evaluation and Assessment    Patient: Sirena Hawkins MRN: [id number]  SSN: [id number]    YOB: 1955  Age: 72 y.o. Sex: male      I have evaluated the patient and they are stable and ready for discharge from the PACU. Cardiovascular Function/Vital Signs  Visit Vitals  /75   Pulse (!) 55   Temp 36.4 °C (97.6 °F)   Resp 12   Ht 5' 10\" (1.778 m)   Wt 101.6 kg (224 lb)   SpO2 96%   BMI 32.14 kg/m²       Patient is status post MAC anesthesia for Procedure(s):  COLONOSCOPY    :-  ENDOSCOPIC POLYPECTOMY. Nausea/Vomiting: None    Postoperative hydration reviewed and adequate. Pain:  Pain Scale 1: Numeric (0 - 10) (08/24/20 1043)  Pain Intensity 1: 0 (08/24/20 1043)   Managed    Neurological Status: At baseline    Mental Status, Level of Consciousness: Alert and  oriented to person, place, and time    Pulmonary Status:   O2 Device: Room air (08/24/20 1043)   Adequate oxygenation and airway patent    Complications related to anesthesia: None    Post-anesthesia assessment completed. No concerns    Signed By: Elvis Donald MD     August 24, 2020              Procedure(s):  COLONOSCOPY    :-  ENDOSCOPIC POLYPECTOMY. MAC    <BSHSIANPOST>    INITIAL Post-op Vital signs:   Vitals Value Taken Time   /79 8/24/2020 10:46 AM   Temp 36.4 °C (97.6 °F) 8/24/2020 10:36 AM   Pulse 51 8/24/2020 10:50 AM   Resp 0 8/24/2020 10:54 AM   SpO2 97 % 8/24/2020 10:50 AM   Vitals shown include unvalidated device data. no weight-bearing restrictions

## (undated) DEVICE — FCPS BX HOT RJ4 2.2MMX240CM -- RADIAL JAW 4 BX/40

## (undated) DEVICE — REM POLYHESIVE ADULT PATIENT RETURN ELECTRODE: Brand: VALLEYLAB